# Patient Record
Sex: FEMALE | Race: WHITE | URBAN - METROPOLITAN AREA
[De-identification: names, ages, dates, MRNs, and addresses within clinical notes are randomized per-mention and may not be internally consistent; named-entity substitution may affect disease eponyms.]

---

## 2023-02-17 ENCOUNTER — HOME HEALTH ADMISSION (OUTPATIENT)
Dept: HOME HEALTH SERVICES | Facility: HOME HEALTH | Age: 88
End: 2023-02-17
Payer: MEDICARE

## 2023-02-20 ENCOUNTER — HOME CARE VISIT (OUTPATIENT)
Dept: SCHEDULING | Facility: HOME HEALTH | Age: 88
End: 2023-02-20
Payer: MEDICARE

## 2023-02-20 PROCEDURE — 1090000001 HH PPS REVENUE CREDIT

## 2023-02-20 PROCEDURE — 400013 HH SOC

## 2023-02-20 PROCEDURE — G0299 HHS/HOSPICE OF RN EA 15 MIN: HCPCS

## 2023-02-20 PROCEDURE — 0221000100 HH NO PAY CLAIM PROCEDURE

## 2023-02-20 PROCEDURE — 1090000002 HH PPS REVENUE DEBIT

## 2023-02-20 RX ADMIN — CYANOCOBALAMIN 1000 MCG: 1000 INJECTION, SOLUTION INTRAMUSCULAR; SUBCUTANEOUS at 12:15

## 2023-02-21 VITALS
OXYGEN SATURATION: 94 % | HEART RATE: 67 BPM | TEMPERATURE: 97.7 F | SYSTOLIC BLOOD PRESSURE: 138 MMHG | DIASTOLIC BLOOD PRESSURE: 64 MMHG | RESPIRATION RATE: 16 BRPM

## 2023-02-21 PROCEDURE — 1090000001 HH PPS REVENUE CREDIT

## 2023-02-21 PROCEDURE — 1090000002 HH PPS REVENUE DEBIT

## 2023-02-21 ASSESSMENT — ENCOUNTER SYMPTOMS
PAIN LOCATION - PAIN QUALITY: ACHES
DYSPNEA ACTIVITY LEVEL: AFTER AMBULATING MORE THAN 20 FT

## 2023-02-21 NOTE — HOME HEALTH
Problem: Admitted to [facility] AT [facility] for Pernicious Anemia/Fatigue. PMH: HTN, Anxiety, Falls, HLD, Hypothyroid, OA, CTS, CAD, Stents to Heart/LE, PVD, [place], [place], Skin CA, Right Hip ORIF. Intervention: [facility] SN SOC: Patient resides in [place] with spouse, alert and oriented x3, follows commands, MAEx4, weakness, unsteady gait, fall risk, ambulatory with walker, wheelchair for longer distances. C/O fatigue R/T anemia, B12 injection administered via left arm without complications, tolerated well. Reports chronic pain R/T OA/CTS, 0-2/10, treated with medication/rest, aches with activity. Medications reconciled, manages independently, reviewed with patient, instructed on high risk medication Plavix/risk for bleeding, taught back understanding. Instructed on [facility] SOC when to call/911, safety/fall/infection/bleeding/pressure injury precautions, medications/disease management, pain, Anemia/fatigue, and plan of care taught back understanding. Goal: Patient will be safe at home free from falls/injury/infection, pain controlled, Anemia managed, fatigue improved, free from complications of bleeding.

## 2023-02-22 PROCEDURE — 1090000002 HH PPS REVENUE DEBIT

## 2023-02-22 PROCEDURE — 1090000001 HH PPS REVENUE CREDIT

## 2023-02-23 PROCEDURE — 1090000002 HH PPS REVENUE DEBIT

## 2023-02-23 PROCEDURE — 1090000001 HH PPS REVENUE CREDIT

## 2023-02-24 PROCEDURE — 1090000002 HH PPS REVENUE DEBIT

## 2023-02-24 PROCEDURE — 1090000001 HH PPS REVENUE CREDIT

## 2023-02-25 PROCEDURE — 1090000002 HH PPS REVENUE DEBIT

## 2023-02-25 PROCEDURE — 1090000001 HH PPS REVENUE CREDIT

## 2023-02-26 PROCEDURE — 1090000001 HH PPS REVENUE CREDIT

## 2023-02-26 PROCEDURE — 1090000002 HH PPS REVENUE DEBIT

## 2023-03-02 ENCOUNTER — HOME CARE VISIT (OUTPATIENT)
Dept: HOME HEALTH SERVICES | Facility: HOME HEALTH | Age: 88
End: 2023-03-02
Payer: MEDICARE

## 2023-03-02 PROCEDURE — G0151 HHCP-SERV OF PT,EA 15 MIN: HCPCS

## 2023-03-07 ENCOUNTER — HOME CARE VISIT (OUTPATIENT)
Dept: HOME HEALTH SERVICES | Facility: HOME HEALTH | Age: 88
End: 2023-03-07
Payer: MEDICARE

## 2023-03-07 PROCEDURE — G0151 HHCP-SERV OF PT,EA 15 MIN: HCPCS

## 2023-03-08 VITALS
HEART RATE: 65 BPM | DIASTOLIC BLOOD PRESSURE: 70 MMHG | SYSTOLIC BLOOD PRESSURE: 140 MMHG | OXYGEN SATURATION: 97 % | TEMPERATURE: 96.9 F

## 2023-03-09 ENCOUNTER — HOME CARE VISIT (OUTPATIENT)
Dept: SCHEDULING | Facility: HOME HEALTH | Age: 88
End: 2023-03-09
Payer: MEDICARE

## 2023-03-09 PROCEDURE — G0151 HHCP-SERV OF PT,EA 15 MIN: HCPCS

## 2023-03-14 ENCOUNTER — HOME CARE VISIT (OUTPATIENT)
Dept: HOME HEALTH SERVICES | Facility: HOME HEALTH | Age: 88
End: 2023-03-14
Payer: MEDICARE

## 2023-03-16 ENCOUNTER — HOME CARE VISIT (OUTPATIENT)
Dept: HOME HEALTH SERVICES | Facility: HOME HEALTH | Age: 88
End: 2023-03-16
Payer: MEDICARE

## 2023-03-16 VITALS
TEMPERATURE: 96.9 F | SYSTOLIC BLOOD PRESSURE: 130 MMHG | DIASTOLIC BLOOD PRESSURE: 55 MMHG | OXYGEN SATURATION: 97 % | DIASTOLIC BLOOD PRESSURE: 58 MMHG | OXYGEN SATURATION: 98 % | SYSTOLIC BLOOD PRESSURE: 130 MMHG | HEART RATE: 54 BPM | TEMPERATURE: 97.1 F | HEART RATE: 62 BPM

## 2023-03-16 PROCEDURE — G0151 HHCP-SERV OF PT,EA 15 MIN: HCPCS

## 2023-03-21 ENCOUNTER — HOME CARE VISIT (OUTPATIENT)
Dept: HOME HEALTH SERVICES | Facility: HOME HEALTH | Age: 88
End: 2023-03-21
Payer: MEDICARE

## 2023-03-22 VITALS
SYSTOLIC BLOOD PRESSURE: 140 MMHG | OXYGEN SATURATION: 98 % | DIASTOLIC BLOOD PRESSURE: 60 MMHG | TEMPERATURE: 97.1 F | HEART RATE: 71 BPM

## 2023-03-23 ENCOUNTER — HOME CARE VISIT (OUTPATIENT)
Dept: HOME HEALTH SERVICES | Facility: HOME HEALTH | Age: 88
End: 2023-03-23
Payer: MEDICARE

## 2023-03-23 ENCOUNTER — HOME CARE VISIT (OUTPATIENT)
Dept: SCHEDULING | Facility: HOME HEALTH | Age: 88
End: 2023-03-23
Payer: MEDICARE

## 2023-03-23 VITALS
SYSTOLIC BLOOD PRESSURE: 128 MMHG | TEMPERATURE: 97.6 F | RESPIRATION RATE: 16 BRPM | HEART RATE: 70 BPM | OXYGEN SATURATION: 98 % | DIASTOLIC BLOOD PRESSURE: 60 MMHG

## 2023-03-23 PROCEDURE — G0151 HHCP-SERV OF PT,EA 15 MIN: HCPCS

## 2023-03-23 PROCEDURE — G0299 HHS/HOSPICE OF RN EA 15 MIN: HCPCS

## 2023-03-23 RX ADMIN — CYANOCOBALAMIN 1000 MCG: 1000 INJECTION, SOLUTION INTRAMUSCULAR; SUBCUTANEOUS at 09:30

## 2023-03-23 NOTE — HOME HEALTH
Problem: Fatigue/weakness/Osteoporosis: Monthy B12 injections, new Priola injections every 6 months, working with therapy to meet goals. Intervention: [facility] SN for injections as noted above, tolerated well, via LUE without complications, taught back understanding. Pain controlled with current treatment, 0-3/10, aches to back/joints with activity, progressing with therapy increased strength and mobility, continue to meet goals. Instructed on medications/disease management, safety/fall/pressure injury/infection/bleeding/sharps precautions and plan of care taught back understanding. Goal: Patient will be safe at home free from falls/injury/infection, pain controlled, rehabilitate to optimal level of function, fatigue improved.

## 2023-03-28 ENCOUNTER — HOME CARE VISIT (OUTPATIENT)
Dept: HOME HEALTH SERVICES | Facility: HOME HEALTH | Age: 88
End: 2023-03-28
Payer: MEDICARE

## 2023-03-28 PROCEDURE — G0151 HHCP-SERV OF PT,EA 15 MIN: HCPCS

## 2023-03-29 VITALS
HEART RATE: 68 BPM | DIASTOLIC BLOOD PRESSURE: 60 MMHG | TEMPERATURE: 97.1 F | SYSTOLIC BLOOD PRESSURE: 120 MMHG | OXYGEN SATURATION: 98 %

## 2023-03-30 ENCOUNTER — HOME CARE VISIT (OUTPATIENT)
Dept: HOME HEALTH SERVICES | Facility: HOME HEALTH | Age: 88
End: 2023-03-30
Payer: MEDICARE

## 2023-03-30 PROCEDURE — G0151 HHCP-SERV OF PT,EA 15 MIN: HCPCS

## 2023-04-19 ENCOUNTER — HOME CARE VISIT (OUTPATIENT)
Dept: SCHEDULING | Facility: HOME HEALTH | Age: 88
End: 2023-04-19

## 2023-04-19 VITALS
HEART RATE: 73 BPM | TEMPERATURE: 97.7 F | RESPIRATION RATE: 17 BRPM | SYSTOLIC BLOOD PRESSURE: 146 MMHG | DIASTOLIC BLOOD PRESSURE: 74 MMHG | OXYGEN SATURATION: 97 %

## 2023-04-19 PROCEDURE — G0299 HHS/HOSPICE OF RN EA 15 MIN: HCPCS

## 2023-04-19 RX ADMIN — CYANOCOBALAMIN 1000 MCG: 1000 INJECTION, SOLUTION INTRAMUSCULAR; SUBCUTANEOUS at 13:35

## 2023-04-19 ASSESSMENT — ENCOUNTER SYMPTOMS: DYSPNEA ACTIVITY LEVEL: AFTER AMBULATING MORE THAN 20 FT

## 2023-04-19 NOTE — HOME HEALTH
Problem: Anemia, Monthly B12 injections, administered as PER MAR, toelrated well, left arm. Intervention: Patient with chronic fatigue R/T anemia, reports she feels beter after recieving B12 injections/improved. Has completed PT, at maximum potential. Chronic pain R/T Carpal tunnel/OA, limited mobility with walker/WC, safety/fall precautions, pain 0-10/10 at times, intermittent to hands/joints, treated with rest/Tylenol. Instructed on safety/fall/pressure injury/bleeding/infection precautions, anemia, B12 injections, pain, medications/disease management, high risk medications Plavix/ASA/risk for bleeding, and plan of care, taught back understanding. Goal: Patient will be safe at home free from falls/injury/infection, free from complications of bleeding, pain controlled, anemia managed.

## 2023-04-27 ENCOUNTER — HOME CARE VISIT (OUTPATIENT)
Dept: HOME HEALTH SERVICES | Facility: HOME HEALTH | Age: 88
End: 2023-04-27
Payer: MEDICARE

## 2023-05-19 ENCOUNTER — HOME CARE VISIT (OUTPATIENT)
Dept: SCHEDULING | Facility: HOME HEALTH | Age: 88
End: 2023-05-19
Payer: MEDICARE

## 2023-05-19 VITALS
TEMPERATURE: 97.8 F | OXYGEN SATURATION: 96 % | SYSTOLIC BLOOD PRESSURE: 128 MMHG | DIASTOLIC BLOOD PRESSURE: 80 MMHG | HEART RATE: 82 BPM | RESPIRATION RATE: 16 BRPM

## 2023-05-19 PROCEDURE — G0299 HHS/HOSPICE OF RN EA 15 MIN: HCPCS

## 2023-05-19 RX ADMIN — CYANOCOBALAMIN 1000 MCG: 1000 INJECTION, SOLUTION INTRAMUSCULAR; SUBCUTANEOUS at 12:45

## 2023-05-19 ASSESSMENT — ENCOUNTER SYMPTOMS: DYSPNEA ACTIVITY LEVEL: AFTER AMBULATING MORE THAN 20 FT

## 2023-05-19 NOTE — HOME HEALTH
Problem: Fatigue/weakness/Osteoporosis: Monthy B12 injections, new Priola injections every 6 months, working with therapy to meet goals. Doing well, VS/SATS WNL, continue injections as scheduled. Intervention: [facility] SN for injection of B12, tolerated well, via LUE without complications, taught back understanding. Pain controlled with current treatment, 0-2/10, aches to back/joints with activity, progressing with therapy increased strength and mobility, continue to meet goals. Instructed on medications/disease management, safety/fall/pressure injury/infection/bleeding/sharps precautions and plan of care taught back understanding. Goal: Patient will be safe at home free from falls/injury/infection, pain controlled, rehabilitate to optimal level of function, fatigue improved.

## 2023-05-24 ENCOUNTER — HOME HEALTH ADMISSION (OUTPATIENT)
Dept: HOME HEALTH SERVICES | Facility: HOME HEALTH | Age: 88
End: 2023-05-24

## 2023-06-16 ENCOUNTER — HOME CARE VISIT (OUTPATIENT)
Dept: SCHEDULING | Facility: HOME HEALTH | Age: 88
End: 2023-06-16

## 2023-06-16 PROCEDURE — G0299 HHS/HOSPICE OF RN EA 15 MIN: HCPCS

## 2023-06-19 VITALS
SYSTOLIC BLOOD PRESSURE: 138 MMHG | OXYGEN SATURATION: 96 % | TEMPERATURE: 98.2 F | DIASTOLIC BLOOD PRESSURE: 80 MMHG | HEART RATE: 62 BPM | RESPIRATION RATE: 18 BRPM

## 2023-06-19 ASSESSMENT — ENCOUNTER SYMPTOMS
DYSPNEA ACTIVITY LEVEL: AFTER AMBULATING MORE THAN 20 FT
STOOL DESCRIPTION: FORMED

## 2023-06-19 NOTE — HOME HEALTH
Problem: Anemia, Monthy B12 injections. Intervention: [facility] SN to perform B12 injections Monthly. Pt given B12 1ML IM Left Upper arm. today    SN 1m/2m, effective 6/16/23    Goal: Patient will be safe at home free from falls/injury/infection, anemia managed.

## 2023-07-17 ENCOUNTER — HOME CARE VISIT (OUTPATIENT)
Dept: SCHEDULING | Facility: HOME HEALTH | Age: 88
End: 2023-07-17
Payer: MEDICARE

## 2023-07-17 VITALS
HEART RATE: 70 BPM | OXYGEN SATURATION: 97 % | RESPIRATION RATE: 17 BRPM | DIASTOLIC BLOOD PRESSURE: 70 MMHG | SYSTOLIC BLOOD PRESSURE: 130 MMHG | TEMPERATURE: 97.9 F

## 2023-07-17 PROCEDURE — G0299 HHS/HOSPICE OF RN EA 15 MIN: HCPCS

## 2023-07-17 ASSESSMENT — ENCOUNTER SYMPTOMS
STOOL DESCRIPTION: FORMED
PAIN LOCATION - PAIN QUALITY: THROBBING

## 2023-07-17 NOTE — HOME HEALTH
Problem: Anemia, HTN, Carpal tunnel right wrist.    Intervention: Pt reported today that she has had 2 injections unable to report the name writer suspects it was cortisone/lidocaine for carpal tunnel to right wrist. She has also report that she was planning to have surgery put has decided she is not going to proced d/t her age. B12 1ML IM Via Right upper arm. Pt tolerated well. Dr Kassandra Frazier was present in [facility] and writer has updated him. Gaols: Pt will remain free of injuries/infection/falls.

## 2023-08-16 ENCOUNTER — HOME CARE VISIT (OUTPATIENT)
Dept: SCHEDULING | Facility: HOME HEALTH | Age: 88
End: 2023-08-16

## 2023-08-16 PROCEDURE — G0299 HHS/HOSPICE OF RN EA 15 MIN: HCPCS

## 2023-08-16 RX ADMIN — CYANOCOBALAMIN 1000 MCG: 1000 INJECTION, SOLUTION INTRAMUSCULAR; SUBCUTANEOUS at 11:15

## 2023-08-20 VITALS
RESPIRATION RATE: 18 BRPM | HEART RATE: 66 BPM | OXYGEN SATURATION: 98 % | TEMPERATURE: 98.2 F | DIASTOLIC BLOOD PRESSURE: 70 MMHG | SYSTOLIC BLOOD PRESSURE: 120 MMHG

## 2023-08-20 ASSESSMENT — ENCOUNTER SYMPTOMS
DYSPNEA ACTIVITY LEVEL: AFTER AMBULATING 10 - 20 FT
STOOL DESCRIPTION: FORMED
CONSTIPATION: 1

## 2023-08-30 ENCOUNTER — HOME CARE VISIT (OUTPATIENT)
Dept: HOME HEALTH SERVICES | Facility: HOME HEALTH | Age: 88
End: 2023-08-30
Payer: MEDICARE

## 2023-09-08 ENCOUNTER — HOME CARE VISIT (OUTPATIENT)
Dept: SCHEDULING | Facility: HOME HEALTH | Age: 88
End: 2023-09-08
Payer: MEDICARE

## 2023-09-08 PROCEDURE — G0299 HHS/HOSPICE OF RN EA 15 MIN: HCPCS

## 2023-09-09 VITALS
HEART RATE: 68 BPM | DIASTOLIC BLOOD PRESSURE: 80 MMHG | RESPIRATION RATE: 18 BRPM | SYSTOLIC BLOOD PRESSURE: 140 MMHG | OXYGEN SATURATION: 95 % | TEMPERATURE: 98.7 F

## 2023-09-09 ASSESSMENT — ENCOUNTER SYMPTOMS
STOOL DESCRIPTION: FORMED
DYSPNEA ACTIVITY LEVEL: AFTER AMBULATING MORE THAN 20 FT

## 2023-09-09 NOTE — HOME HEALTH
Problem: Anemia, Monthly B12 injections, Prolia injections Q6months                   Intervention: Patient with chronic fatigue R/T intrinsic anemia, reports she feels beter after recieving B12 injections/improved. Chronic pain R/T Carpal tunnel/OA, reported that at her age she does not want to have surgery. Lungs CTA. Denies increase in SOB. B12 1ML IM Right  upper arm,pt tolerated without complications/concerns. limited mobility with walker/WC, safety/fall precautions, pain 0-10/10 at times, intermittent to hands/joints, treated with rest/Tylenol. Prolia 60MG SQ given Left upper arm . Pt tolerated with out complicactions or concerns. Educated on safety/fall/pressure injury/bleeding/infection precautions, anemia, B12 injections, pain, medications/disease management, high risk medications Plavix/ASA/risk for bleeding, and plan of care, taught back understanding. Plan SNV 1M2.                     Goal: Patient will be safe at home free from falls/injury/infection, free from complications of bleeding, pain controlled, anemia managed

## 2023-09-14 ENCOUNTER — HOME CARE VISIT (OUTPATIENT)
Dept: SCHEDULING | Facility: HOME HEALTH | Age: 88
End: 2023-09-14
Payer: MEDICARE

## 2023-09-14 PROCEDURE — G0299 HHS/HOSPICE OF RN EA 15 MIN: HCPCS

## 2023-09-15 VITALS
SYSTOLIC BLOOD PRESSURE: 122 MMHG | DIASTOLIC BLOOD PRESSURE: 70 MMHG | OXYGEN SATURATION: 98 % | HEART RATE: 70 BPM | TEMPERATURE: 98.7 F | RESPIRATION RATE: 17 BRPM

## 2023-09-15 ASSESSMENT — ENCOUNTER SYMPTOMS
STOOL DESCRIPTION: FORMED
DYSPNEA ACTIVITY LEVEL: AFTER AMBULATING MORE THAN 20 FT

## 2023-09-15 NOTE — HOME HEALTH
Problem: Anemia, OA    Intervention: Pt seen today for reassess, she will contninue to require monthly B12 injections monthly, as well as Proloa injection Q6Months. .     Goals: Pt will remain free from infections/injuries/infection/falls.

## 2023-09-27 ENCOUNTER — HOME CARE VISIT (OUTPATIENT)
Dept: SCHEDULING | Facility: HOME HEALTH | Age: 88
End: 2023-09-27
Payer: MEDICARE

## 2023-09-27 PROCEDURE — G0299 HHS/HOSPICE OF RN EA 15 MIN: HCPCS

## 2023-09-27 RX ADMIN — METHYLPREDNISOLONE ACETATE 80 MG: 80 INJECTION, SUSPENSION INTRA-ARTICULAR; INTRALESIONAL; INTRAMUSCULAR; SOFT TISSUE at 12:00

## 2023-09-28 ENCOUNTER — HOME CARE VISIT (OUTPATIENT)
Dept: SCHEDULING | Facility: HOME HEALTH | Age: 88
End: 2023-09-28
Payer: MEDICARE

## 2023-09-28 PROCEDURE — G0299 HHS/HOSPICE OF RN EA 15 MIN: HCPCS

## 2023-09-28 RX ADMIN — METHYLPREDNISOLONE ACETATE 80 MG: 80 INJECTION, SUSPENSION INTRA-ARTICULAR; INTRALESIONAL; INTRAMUSCULAR; SOFT TISSUE at 08:00

## 2023-10-03 VITALS
DIASTOLIC BLOOD PRESSURE: 68 MMHG | HEART RATE: 60 BPM | RESPIRATION RATE: 18 BRPM | OXYGEN SATURATION: 97 % | TEMPERATURE: 98.2 F | SYSTOLIC BLOOD PRESSURE: 122 MMHG

## 2023-10-03 ASSESSMENT — ENCOUNTER SYMPTOMS
STOOL DESCRIPTION: FORMED
PAIN LOCATION - PAIN QUALITY: POUNDING
DYSPNEA ACTIVITY LEVEL: AFTER AMBULATING 10 - 20 FT

## 2023-10-03 NOTE — HOME HEALTH
Problem:Cervical Neuralgia    Intervention: Pt recently DX with above, planning to be seen by pain clinic Monday 10/2/23. Depo-Medrol 80MG/1ML given Left Hip via z-track method. Pt tolerated without complications or concerns. She has also been advised to use Alleve qd for neck pain. Neck pain reported to be 4/10. Some relief with Alleve decreasing to 2/10. Goals Pt will be pain free.

## 2023-10-04 VITALS
HEART RATE: 62 BPM | TEMPERATURE: 97.9 F | DIASTOLIC BLOOD PRESSURE: 80 MMHG | OXYGEN SATURATION: 98 % | SYSTOLIC BLOOD PRESSURE: 130 MMHG | RESPIRATION RATE: 17 BRPM

## 2023-10-04 ASSESSMENT — ENCOUNTER SYMPTOMS
DYSPNEA ACTIVITY LEVEL: AFTER AMBULATING MORE THAN 20 FT
STOOL DESCRIPTION: FORMED

## 2023-10-04 NOTE — HOME HEALTH
Problem:Cervical Neuralgia         Intervention: Pt recently DX with above, planning to be seen by pain clinic Monday 10/2/23. Depo-Medrol 80MG/1ML given Right Hip via z-track method. Pt tolerated without complications or concerns. She has also been advised to use Alleve qd for neck pain. Neck pain reported to be 4/10. Some relief with Alleve decreasing to 2/10.               Goals Pt will be pain free

## 2023-10-13 ENCOUNTER — HOME CARE VISIT (OUTPATIENT)
Dept: SCHEDULING | Facility: HOME HEALTH | Age: 88
End: 2023-10-13

## 2023-10-13 VITALS
HEART RATE: 62 BPM | TEMPERATURE: 97.6 F | DIASTOLIC BLOOD PRESSURE: 60 MMHG | OXYGEN SATURATION: 98 % | RESPIRATION RATE: 18 BRPM | SYSTOLIC BLOOD PRESSURE: 120 MMHG

## 2023-10-13 PROCEDURE — G0299 HHS/HOSPICE OF RN EA 15 MIN: HCPCS

## 2023-10-13 ASSESSMENT — ENCOUNTER SYMPTOMS
PAIN LOCATION - PAIN QUALITY: THROBBING
DYSPNEA ACTIVITY LEVEL: AFTER AMBULATING MORE THAN 20 FT
STOOL DESCRIPTION: FORMED

## 2023-10-13 NOTE — HOME HEALTH
Problem: Anemia, Monthly B12 injections, recent cervical pain         Intervention: Patient with chronic fatigue R/T anemia, reports she feels beter after recieving B12 injections/improved. B12  1ML given today via Left arm pt tolerated without complications or concerns. Chronic pain R/T Carpal tunnel/OA, limited mobility with walker/WC, safety/fall precautions, pain 0-10/10 at times, intermittent to hands/joints, treated with rest/Tylenol. Instructed on safety/fall/pressure injury/bleeding/infection precautions, anemia, B12 injections, pain, medications/disease management, high risk medications Plavix/ASA/risk for bleeding, and plan of care, taught back understanding. Goal: Patient will be safe at home free from falls/injury/infection, free from complications of bleeding, pain controlled, anemia managed.

## 2023-10-31 ENCOUNTER — HOME CARE VISIT (OUTPATIENT)
Dept: SCHEDULING | Facility: HOME HEALTH | Age: 88
End: 2023-10-31
Payer: MEDICARE

## 2023-10-31 VITALS
DIASTOLIC BLOOD PRESSURE: 60 MMHG | OXYGEN SATURATION: 98 % | SYSTOLIC BLOOD PRESSURE: 110 MMHG | TEMPERATURE: 98.1 F | HEART RATE: 60 BPM | RESPIRATION RATE: 17 BRPM

## 2023-10-31 PROCEDURE — G0299 HHS/HOSPICE OF RN EA 15 MIN: HCPCS

## 2023-10-31 ASSESSMENT — ENCOUNTER SYMPTOMS
DYSPNEA ACTIVITY LEVEL: AFTER AMBULATING MORE THAN 20 FT
STOOL DESCRIPTION: FORMED
PAIN LOCATION - PAIN QUALITY: THROBBING

## 2023-10-31 NOTE — HOME HEALTH
Problem: Reported freq and urgency with urination. Intervention: Extra SNV today for obtaining UA/UC to R/O UTI. Pt remains afebrile denies pain with urination. She has no reported flank pain and denies foul smelling odor. Pt had just urinated and writer has labeled her speciman cup and she will call nurse at [facility] to  and call for lab p/u. Goals: Pt will be free from infection.

## 2023-11-08 ENCOUNTER — HOME CARE VISIT (OUTPATIENT)
Dept: SCHEDULING | Facility: HOME HEALTH | Age: 88
End: 2023-11-08
Payer: MEDICARE

## 2023-11-08 VITALS
SYSTOLIC BLOOD PRESSURE: 124 MMHG | OXYGEN SATURATION: 98 % | TEMPERATURE: 97.9 F | DIASTOLIC BLOOD PRESSURE: 78 MMHG | RESPIRATION RATE: 18 BRPM | HEART RATE: 70 BPM

## 2023-11-08 PROCEDURE — G0299 HHS/HOSPICE OF RN EA 15 MIN: HCPCS

## 2023-11-08 RX ADMIN — CYANOCOBALAMIN 1000 MCG: 1000 INJECTION, SOLUTION INTRAMUSCULAR; SUBCUTANEOUS at 11:20

## 2023-11-08 NOTE — HOME HEALTH
Problem: Anemia, Monthly B12 injections, Prolia injections Q6months                                       Intervention: Patient with chronic fatigue R/T intrinsic anemia, reports she feels beter after recieving B12 injections/improved. Chronic pain R/T Carpal tunnel/OA, reported that at her age she does not want to have surgery. Lungs CTA. Denies increase in SOB. B12 1ML IM Left  upper arm,pt tolerated without complications/concerns. limited mobility with walker/WC, safety/fall precautions, pain 0-10/10 at times, intermittent to hands/joints, treated with rest/Tylenol. Educated on safety/fall/pressure injury/bleeding/infection precautions, anemia, B12 injections, pain, medications/disease management, high risk medications Plavix/ASA/risk for bleeding, and plan of care, taught back understanding. Plan SNV 1M2.         Goal: Patient will be safe at home free from falls/injury/infection, free from complications of bleeding, pain controlled, anemia managed

## 2023-12-06 ENCOUNTER — HOME CARE VISIT (OUTPATIENT)
Dept: HOME HEALTH SERVICES | Facility: HOME HEALTH | Age: 88
End: 2023-12-06
Payer: MEDICARE

## 2023-12-06 PROCEDURE — G0299 HHS/HOSPICE OF RN EA 15 MIN: HCPCS

## 2023-12-07 VITALS
SYSTOLIC BLOOD PRESSURE: 122 MMHG | DIASTOLIC BLOOD PRESSURE: 78 MMHG | OXYGEN SATURATION: 98 % | RESPIRATION RATE: 18 BRPM | HEART RATE: 70 BPM | TEMPERATURE: 98.2 F

## 2023-12-07 ASSESSMENT — ENCOUNTER SYMPTOMS
STOOL DESCRIPTION: FORMED
DYSPNEA ACTIVITY LEVEL: AFTER AMBULATING MORE THAN 20 FT

## 2023-12-13 ENCOUNTER — HOME CARE VISIT (OUTPATIENT)
Dept: HOME HEALTH SERVICES | Facility: HOME HEALTH | Age: 88
End: 2023-12-13

## 2023-12-13 VITALS
DIASTOLIC BLOOD PRESSURE: 68 MMHG | SYSTOLIC BLOOD PRESSURE: 120 MMHG | HEART RATE: 68 BPM | TEMPERATURE: 98.2 F | RESPIRATION RATE: 18 BRPM | OXYGEN SATURATION: 98 %

## 2023-12-13 PROCEDURE — G0299 HHS/HOSPICE OF RN EA 15 MIN: HCPCS

## 2023-12-13 ASSESSMENT — ENCOUNTER SYMPTOMS
DYSPNEA ACTIVITY LEVEL: AFTER AMBULATING MORE THAN 20 FT
STOOL DESCRIPTION: FORMED

## 2023-12-13 NOTE — HOME HEALTH
Problem: Anemia, Monthly B12 injections, recent cervical pain                   Intervention: Patient with chronic fatigue R/T anemia, reports she feels beter after recieving B12 injections/improved. B12  1ML given today via Left arm pt tolerated without complications or concerns. Chronic pain R/T Carpal tunnel/OA, limited mobility with walker/WC, safety/fall precautions, pain 0-10/10 at times, intermittent to hands/joints, treated with rest/Tylenol. Instructed on safety/fall/pressure injury/bleeding/infection precautions, anemia, B12 injections, pain, medications/disease management, high risk medications Plavix/ASA/risk for bleeding, and plan of care, taught back understanding. Last B12 was given 12/7/23. SHe under went carpal tunnel surgery on Right wrist first of December with good results incision has healed EVETTE. Plan SNV to continue monthly for nursing assesment and injections. Goal: Patient will be safe at home free from falls/injury/infection, free from complications of bleeding, pain controlled, anemia managed.

## 2024-01-08 ENCOUNTER — HOME CARE VISIT (OUTPATIENT)
Dept: SCHEDULING | Facility: HOME HEALTH | Age: 89
End: 2024-01-08
Payer: MEDICARE

## 2024-01-08 VITALS
RESPIRATION RATE: 18 BRPM | HEART RATE: 70 BPM | DIASTOLIC BLOOD PRESSURE: 78 MMHG | SYSTOLIC BLOOD PRESSURE: 128 MMHG | OXYGEN SATURATION: 98 % | TEMPERATURE: 98.7 F

## 2024-01-08 PROCEDURE — G0299 HHS/HOSPICE OF RN EA 15 MIN: HCPCS

## 2024-01-08 ASSESSMENT — ENCOUNTER SYMPTOMS
STOOL DESCRIPTION: FORMED
DYSPNEA ACTIVITY LEVEL: AFTER AMBULATING MORE THAN 20 FT

## 2024-01-08 NOTE — HOME HEALTH
Problem: Anemia, Monthly B12 injections, recent cervical pain    Intervention: Patient with chronic fatigue R/T anemia, reports she feels beter after recieving B12 injections/improved. B12  1ML given today via Left arm pt tolerated without complications or concerns.  Chronic pain R/T Carpal tunnel/OA, limited mobility with walker/WC, safety/fall precautions, pain 0-10/10 at times, intermittent to hands/joints, treated with rest/Tylenol. Instructed on safety/fall/pressure injury/bleeding/infection precautions, anemia, B12 injections, pain, medications/disease management, high risk medications Plavix/ASA/risk for bleeding, and plan of care, taught back understanding. Last B12 was given 1/8/24.         Goal: Patient will be safe at home free from falls/injury/infection, free from complications of bleeding, pain controlled, anemia managed.

## 2024-02-12 ENCOUNTER — HOME CARE VISIT (OUTPATIENT)
Dept: HOME HEALTH SERVICES | Facility: HOME HEALTH | Age: 89
End: 2024-02-12

## 2024-02-12 PROCEDURE — G0299 HHS/HOSPICE OF RN EA 15 MIN: HCPCS

## 2024-02-12 RX ADMIN — CYANOCOBALAMIN 1000 MCG: 1000 INJECTION, SOLUTION INTRAMUSCULAR; SUBCUTANEOUS at 09:30

## 2024-02-19 VITALS
OXYGEN SATURATION: 98 % | HEART RATE: 77 BPM | DIASTOLIC BLOOD PRESSURE: 79 MMHG | TEMPERATURE: 98.1 F | SYSTOLIC BLOOD PRESSURE: 126 MMHG | RESPIRATION RATE: 18 BRPM

## 2024-02-19 ASSESSMENT — ENCOUNTER SYMPTOMS
DYSPNEA ACTIVITY LEVEL: AFTER AMBULATING MORE THAN 20 FT
STOOL DESCRIPTION: FORMED

## 2024-02-19 NOTE — HOME HEALTH
Problem: Anemia, Monthly B12 injections, recent cervical pain                                       Intervention: Patient with chronic fatigue R/T anemia, reports she feels beter after recieving B12 injections/improved. B12  1ML given today via Left arm pt tolerated without complications or concerns.  Chronic pain R/T Carpal tunnel/OA, limited mobility with walker/WC, safety/fall precautions, pain 0-10/10 at times, intermittent to hands/joints, treated with rest/Tylenol. Instructed on safety/fall/pressure injury/bleeding/infection precautions, anemia, B12 injections, pain, medications/disease management, high risk medications Plavix/ASA/risk for bleeding, and plan of care, taught back understanding. Last B12 was given 2/12/24 via Left Deltiod. Pt tolerated without complications or concerns. Plan SNV to continue monthly for nursing B12 injections    Goal: Patient will be safe at home free from falls/injury/infection, free from complications of bleeding, pain controlled, anemia managed.

## 2024-02-29 ENCOUNTER — HOME CARE VISIT (OUTPATIENT)
Dept: SCHEDULING | Facility: HOME HEALTH | Age: 89
End: 2024-02-29
Payer: MEDICARE

## 2024-02-29 VITALS
SYSTOLIC BLOOD PRESSURE: 114 MMHG | RESPIRATION RATE: 16 BRPM | DIASTOLIC BLOOD PRESSURE: 70 MMHG | HEART RATE: 70 BPM | OXYGEN SATURATION: 95 % | TEMPERATURE: 98 F

## 2024-02-29 PROCEDURE — G0299 HHS/HOSPICE OF RN EA 15 MIN: HCPCS

## 2024-02-29 ASSESSMENT — ENCOUNTER SYMPTOMS
STOOL DESCRIPTION: FORMED
PAIN LOCATION - PAIN QUALITY: ACHEY
DYSPNEA ACTIVITY LEVEL: AFTER AMBULATING MORE THAN 20 FT

## 2024-03-01 NOTE — HOME HEALTH
Patient with dementia  Fall prevention and use of anticoagulant with potential of bleeding tendancies taught  Pt able to verbalize teachback method and denies any bleeding tendenciesProvided per care plan. Pt tolerated well.   Caregiver involvement: Pt lives in an longterm with caregivers  Medications reconciled and all medications are available in EMR.  Patient education provided this visit: SN educated patient and patient's caregiver on s/s of bleeding tendencies and fall  prevention measures..  Patient and patient caregiver verbalizes understanding via the teach back method.   The following discharge planning was discussed with the patient/ patient's caregiver: DC when goals met .

## 2024-03-28 ENCOUNTER — HOME CARE VISIT (OUTPATIENT)
Dept: HOME HEALTH SERVICES | Facility: HOME HEALTH | Age: 89
End: 2024-03-28
Payer: MEDICARE

## 2024-03-28 VITALS
OXYGEN SATURATION: 95 % | DIASTOLIC BLOOD PRESSURE: 70 MMHG | SYSTOLIC BLOOD PRESSURE: 124 MMHG | RESPIRATION RATE: 16 BRPM | HEART RATE: 70 BPM | TEMPERATURE: 98 F

## 2024-03-28 PROCEDURE — G0299 HHS/HOSPICE OF RN EA 15 MIN: HCPCS

## 2024-03-28 ASSESSMENT — ENCOUNTER SYMPTOMS
DYSPNEA ACTIVITY LEVEL: AFTER AMBULATING MORE THAN 20 FT
STOOL DESCRIPTION: HARD

## 2024-03-29 NOTE — HOME HEALTH
Pt states that she is planning to relocate to [facility] next month and is in the process of packing Pt lives with her  currently who has dementia problems in and [facility]  Pt has aaltered balance and ambulates with a walker.  Pt able to verbalize medication and demonstrates compliance.  Education provided of the need to transfer medications to her home that she reloctes to.  Assistant director of nursing is to order vitamen b12 and pprolia to be given next month prior to when she moves.  Pt skin integrity remains intact  Pt instruction of fall revention.Pt denies any s/s of UTI

## 2024-04-09 ENCOUNTER — HOME CARE VISIT (OUTPATIENT)
Dept: HOME HEALTH SERVICES | Facility: HOME HEALTH | Age: 89
End: 2024-04-09
Payer: MEDICARE

## 2024-04-09 PROCEDURE — G0299 HHS/HOSPICE OF RN EA 15 MIN: HCPCS

## 2024-04-18 VITALS
SYSTOLIC BLOOD PRESSURE: 120 MMHG | OXYGEN SATURATION: 98 % | HEART RATE: 60 BPM | TEMPERATURE: 98 F | DIASTOLIC BLOOD PRESSURE: 70 MMHG | RESPIRATION RATE: 16 BRPM

## 2024-04-18 ASSESSMENT — ENCOUNTER SYMPTOMS: HEMOPTYSIS: 0

## 2024-04-18 NOTE — HOME HEALTH
Reason for the visit: discharge pt [place]  Discussion of care to date: pt instruction of transfer of care with primary care provider  Discussion of goals met and/or continued needs: pt has apt with PCP trsansfer of records from Dr Lindsey   Caregiver involvement: Pt is [place] out [place] where she will have caregiver services   Medication reconciliation:  pt has written format of medication list  Patient education provided this visit: Pt instruction of the need for continuity of care with list of medication provided for pt to receive vitamen B12  Patient/cg response to education: Pt verbalizes understanding of a teachback method with apt made in May with new PCP  Discharge from home health reason transfer to another state